# Patient Record
Sex: MALE | Race: WHITE | NOT HISPANIC OR LATINO
[De-identification: names, ages, dates, MRNs, and addresses within clinical notes are randomized per-mention and may not be internally consistent; named-entity substitution may affect disease eponyms.]

---

## 2021-10-14 ENCOUNTER — TRANSCRIPTION ENCOUNTER (OUTPATIENT)
Age: 21
End: 2021-10-14

## 2021-10-14 ENCOUNTER — APPOINTMENT (OUTPATIENT)
Dept: PEDIATRIC ORTHOPEDIC SURGERY | Facility: CLINIC | Age: 21
End: 2021-10-14
Payer: MEDICAID

## 2021-10-14 DIAGNOSIS — Z78.9 OTHER SPECIFIED HEALTH STATUS: ICD-10-CM

## 2021-10-14 DIAGNOSIS — Z00.00 ENCOUNTER FOR GENERAL ADULT MEDICAL EXAMINATION W/OUT ABNORMAL FINDINGS: ICD-10-CM

## 2021-10-14 PROCEDURE — 99204 OFFICE O/P NEW MOD 45 MIN: CPT

## 2021-10-14 PROCEDURE — 72082 X-RAY EXAM ENTIRE SPI 2/3 VW: CPT

## 2021-10-20 PROBLEM — Z78.9 NO PERTINENT PAST SURGICAL HISTORY: Status: RESOLVED | Noted: 2021-10-20 | Resolved: 2021-10-20

## 2021-10-20 PROBLEM — Z78.9 NO PERTINENT PAST MEDICAL HISTORY: Status: RESOLVED | Noted: 2021-10-20 | Resolved: 2021-10-20

## 2021-10-20 NOTE — ASSESSMENT
[FreeTextEntry1] : 21 year old male with spinal asymmetry and Scheuermann's kyphosis\par \par Clinical findings and x-ray results were reviewed at length with the patient. We discussed at length the natural history, etiology, pathoanatomy and treatment modalities of scoliosis and kyphosis with patient. Patient's obtained radiographs are remarkable for spinal asymmetries measuring less than 10 degrees and Scheuermann's kyphosis. Regarding his spinal asymmetries, Explained to patient and parent that for curves measuring 25 degrees, a brace regimen is typically implemented for treatment. For curves of 40 degrees or more, surgical intervention is warranted. Given patient has completed his spinal growth, it is very unlikely for patient's curve to progress. Regarding his Scheuermann's kyphosis, discussed with patient that given the severity of patient's curve, the curve will likely progress at a rate of 1-2 degrees each year moving forward. Discussed options of close observation versus surgical intervention with patient and parent. Patient remains uncertain, and will begin consideration of surgical intervention at home. In the meantime, I am recommending a daily back and core strengthening exercise regimen to be implemented 4 days a week for at least 30 minutes each day. Exercise sheet was given and exercises were demonstrated during today's visit. No other orthopedic intervention was deemed necessary at this time. All questions and concerns were addressed. Patient vocalized understanding and agreement to assessment and treatment plan. We will plan to see FARRAH back in clinic after he has further considered surgery. Natural history of spine deformity discussed. Risk of progression explained.. Risk of back pain explained. Possibility of arthritis discussed. Spine deformity affecting organ systems, lungs, GI etc discussed. Deformity relationship with growth and effect on patient's height explained. Activities impact and limitations discussed. Activity limitations explained. Impact of daily activities- sleeping position, sitting position, lifting heavy weights etc explained. Importance of stretching, exercises, bone health and nutrition explained. Role of genetics and risk of deformity in siblings and progenies explained. \par \par I, Александр Hamilton, acted solely as a scribe for Dr. Cox and documented this information on this date; 10/14/2021.

## 2021-10-20 NOTE — REASON FOR VISIT
[Initial Evaluation] : an initial evaluation [Patient] : patient [FreeTextEntry1] : Scoliosis evaluation

## 2021-10-20 NOTE — HISTORY OF PRESENT ILLNESS
[Stable] : stable [1] : currently ~his/her~ pain is 1 out of 10 [Intermit.] : ~He/She~ states the symptoms seem to be intermittent [FreeTextEntry1] : 21 year old male presents today independently for an initial evaluation of his spinal asymmetries. Patient indicates that he is aware of his very poor posture in his thoracic spine. He notes that he goes to the gym the every day, and he has actively done back and core exercises but has not noticed any improvement. He presented to an outside orthopedic surgeon who obtained x-rays on 09/30/2021, and he was prescribed physical therapy. He was also advised to present to a spine specialist for further evaluation. Patient denies any recent fevers, chills or night sweats. Denies any recent trauma or injuries. He denies any back pain, radiating pain, numbness, tingling sensations, discomfort, weakness to the LE, radiating LE pain, or bladder/bowel dysfunction. He has been participating in all of his normal physical activities without restrictions or discomfort. Patient denies any family history of scoliosis.

## 2021-10-20 NOTE — DATA REVIEWED
[de-identified] : AP and lateral spine radiographs were ordered, obtained, and independently reviewed on 10/14/2021 in clinic depicting spinal asymmetries measuring less than 10 degrees. Patient is Risser 5. Moderately increased postural kyphosis noted on lateral films measuring 70 degrees. No spondylolisthesis or spondylolysis noted on AP or lateral films.

## 2021-10-20 NOTE — PHYSICAL EXAM
[FreeTextEntry1] : General: Patient is awake and alert and in no acute distress, oriented to person, place, and time. Well developed, well nourished, cooperative. \par \par Skin: The skin is intact, warm, pink, and dry over the area examined.  \par \par Eyes: normal conjunctiva, normal eyelids and pupils were equal and round. \par \par ENT: normal ears, normal nose and normal lips.\par \par Cardiovascular: There is brisk capillary refill in the digits of the affected extremity. They are symmetric pulses in the bilateral upper and lower extremities, positive peripheral pulses, brisk capillary refill, but no peripheral edema.\par \par Respiratory: The patient is in no apparent respiratory distress. They're taking full deep breaths without use of accessory muscles or evidence of audible wheezes or stridor without the use of a stethoscope, normal respiratory effort. \par \par Neurological: 5/5 motor strength in the main muscle groups of bilateral lower extremities, sensory intact in bilateral lower extremities. \par \par Musculoskeletal:\par Neurological examination reveals a grade 5/5 muscle power. Deep tendon reflexes are 1+ with ankle jerk and knee jerk.  The plantars are bilaterally down going.  Superficial abdominal reflexes are symmetric and intact.  The biceps and triceps reflexes are 1+.  The Tino test is negative. \par There is no asymmetry of calves, no significant leg length discrepancy or significant cafe-au-lait spots. Abdominal reflexes in all 4 quadrants present. \par  \par Examination of both the upper and lower extremities:\par No obvious abnormalities. 5/5 muscle strength bilaterally.  There is no gross deformity.  Patient has full range of motion of both the hips, knees, ankles, wrists, elbows, and shoulders.  Neck range of motion is full and free without any pain or spasm. Normal appearing fingers and toes. No large birthmarks noted. DTR's are intact.\par \par Examination of back: \par Moderate rounding of the thoracic spine noted on Masood's forward bending exam. Mild shoulder asymmetry with right > left. Mild flank asymmetry with right sided prominence and left sided waist crease. Mild right thoracic prominence noted on Masood's forward bending exam. Patient is well balanced and able to bend forward/backward/laterally without pain or discomfort. Able to jump/squat and maintain tip-toe/heel-stand stance without pain or discomfort.

## 2021-10-20 NOTE — REVIEW OF SYSTEMS
[Change in Activity] : no change in activity [Fever Above 102] : no fever [Itching] : no itching [Eczema] : no eczema [Redness] : no redness [Blurry Vision] : no blurred vision [Sore Throat] : no sore throat [Earache] : no earache [Heart Problems] : no heart problems [Murmur] : no murmur [Tachypnea] : no tachypnea [Wheezing] : no wheezing [Cough] : no cough [Asthma] : no asthma [Vomiting] : no vomiting [Diarrhea] : no diarrhea [Constipation] : no constipation [Bladder Infection] : no bladder infection [Testicular Pain] : no testicular pain [Limping] : no limping [Joint Pains] : no arthralgias [Joint Swelling] : no joint swelling [Back Pain] : ~T no back pain [Muscle Aches] : no muscle aches [Seizure] : no seizures [Headache] : no headache [Hyperactive] : no hyperactive behavior [Emotional Problems] : no ~T emotional problems [Cold Intolerance] : cold tolerant [Heat Intolerance] : heat tolerant [Bruising] : no tendency for easy bruising [Bleeding Problems] : no bleeding problems [Frequent Infections] : no frequent infections [Immune Deficiencies] : no immune deficiencies

## 2021-10-21 DIAGNOSIS — M42.00 JUVENILE OSTEOCHONDROSIS OF SPINE, SITE UNSPECIFIED: ICD-10-CM

## 2021-10-25 ENCOUNTER — NON-APPOINTMENT (OUTPATIENT)
Age: 21
End: 2021-10-25

## 2021-10-26 ENCOUNTER — NON-APPOINTMENT (OUTPATIENT)
Age: 21
End: 2021-10-26

## 2021-10-27 ENCOUNTER — APPOINTMENT (OUTPATIENT)
Dept: CARDIOLOGY | Facility: CLINIC | Age: 21
End: 2021-10-27
Payer: MEDICAID

## 2021-10-27 ENCOUNTER — NON-APPOINTMENT (OUTPATIENT)
Age: 21
End: 2021-10-27

## 2021-10-27 VITALS
WEIGHT: 169 LBS | TEMPERATURE: 97.9 F | HEIGHT: 74.02 IN | HEART RATE: 59 BPM | OXYGEN SATURATION: 100 % | DIASTOLIC BLOOD PRESSURE: 68 MMHG | SYSTOLIC BLOOD PRESSURE: 118 MMHG | BODY MASS INDEX: 21.69 KG/M2

## 2021-10-27 DIAGNOSIS — Z78.9 OTHER SPECIFIED HEALTH STATUS: ICD-10-CM

## 2021-10-27 DIAGNOSIS — Z01.810 ENCOUNTER FOR PREPROCEDURAL CARDIOVASCULAR EXAMINATION: ICD-10-CM

## 2021-10-27 PROCEDURE — 99203 OFFICE O/P NEW LOW 30 MIN: CPT | Mod: 25

## 2021-10-27 PROCEDURE — 93000 ELECTROCARDIOGRAM COMPLETE: CPT | Mod: NC

## 2021-10-27 PROCEDURE — 93306 TTE W/DOPPLER COMPLETE: CPT

## 2021-10-27 NOTE — REVIEW OF SYSTEMS
[Negative] : Respiratory [Blurry Vision] : no blurred vision [Dyspnea on exertion] : not dyspnea during exertion [Chest Discomfort] : no chest discomfort [Lower Ext Edema] : no extremity edema [Palpitations] : no palpitations [Orthopnea] : no orthopnea [PND] : no PND [Abdominal Pain] : no abdominal pain [Nausea] : no nausea [Vomiting] : no vomiting [Heartburn] : no heartburn [Rash] : no rash [Itching] : no itching [Dizziness] : no dizziness [Tremor] : no tremor was seen [Numbness (Hypoesthesia)] : no numbness [Confusion] : no confusion was observed [Anxiety] : no anxiety [Under Stress] : not under stress [Easy Bleeding] : no tendency for easy bleeding [Easy Bruising] : no tendency for easy bruising [FreeTextEntry9] : back pain.

## 2021-10-27 NOTE — HISTORY OF PRESENT ILLNESS
[FreeTextEntry1] : Satish Lindsey is a 21 year old male with Scheuermann's Kyphosis comes for pre- op cardiac evaluation for back surgery for Kyphosis. Denies any chest pain or palpitations. No shortness of breath on exertion. Physically active.

## 2021-10-27 NOTE — DISCUSSION/SUMMARY
[FreeTextEntry1] : In a summary Satish Lindsey is a young male for pre- op cardiac evaluation for Kyphosis, Echo done showed normal LV systolic function and mild TR. Echo results explained.